# Patient Record
Sex: FEMALE | Race: OTHER | HISPANIC OR LATINO | Employment: FULL TIME | URBAN - METROPOLITAN AREA
[De-identification: names, ages, dates, MRNs, and addresses within clinical notes are randomized per-mention and may not be internally consistent; named-entity substitution may affect disease eponyms.]

---

## 2021-09-02 ENCOUNTER — OFFICE VISIT (OUTPATIENT)
Dept: OBSTETRICS AND GYNECOLOGY | Facility: CLINIC | Age: 51
End: 2021-09-02

## 2021-09-02 VITALS
SYSTOLIC BLOOD PRESSURE: 122 MMHG | BODY MASS INDEX: 34.21 KG/M2 | DIASTOLIC BLOOD PRESSURE: 82 MMHG | HEIGHT: 67 IN | WEIGHT: 218 LBS | HEART RATE: 85 BPM

## 2021-09-02 DIAGNOSIS — N76.0 BACTERIAL VAGINOSIS: ICD-10-CM

## 2021-09-02 DIAGNOSIS — Z12.11 ENCOUNTER FOR SCREENING COLONOSCOPY: ICD-10-CM

## 2021-09-02 DIAGNOSIS — Z76.89 ENCOUNTER TO ESTABLISH CARE WITH NEW DOCTOR: ICD-10-CM

## 2021-09-02 DIAGNOSIS — Z01.419 ENCOUNTER FOR GYNECOLOGICAL EXAMINATION WITHOUT ABNORMAL FINDING: Primary | ICD-10-CM

## 2021-09-02 DIAGNOSIS — B96.89 BACTERIAL VAGINOSIS: ICD-10-CM

## 2021-09-02 DIAGNOSIS — Z12.31 ENCOUNTER FOR MAMMOGRAM TO ESTABLISH BASELINE MAMMOGRAM: ICD-10-CM

## 2021-09-02 PROCEDURE — 99386 PREV VISIT NEW AGE 40-64: CPT | Mod: S$GLB,,, | Performed by: OBSTETRICS & GYNECOLOGY

## 2021-09-02 PROCEDURE — 99386 PR PREVENTIVE VISIT,NEW,40-64: ICD-10-PCS | Mod: S$GLB,,, | Performed by: OBSTETRICS & GYNECOLOGY

## 2021-09-02 RX ORDER — METRONIDAZOLE 500 MG/1
500 TABLET ORAL EVERY 12 HOURS
Qty: 14 TABLET | Refills: 0 | Status: SHIPPED | OUTPATIENT
Start: 2021-09-02 | End: 2021-09-09

## 2021-09-28 ENCOUNTER — OFFICE VISIT (OUTPATIENT)
Dept: SURGERY | Facility: CLINIC | Age: 51
End: 2021-09-28

## 2021-09-28 DIAGNOSIS — Z78.9 AT AVERAGE RISK FOR MALIGNANT NEOPLASM OF COLON: ICD-10-CM

## 2021-09-28 PROCEDURE — 99202 PR OFFICE/OUTPT VISIT, NEW, LEVL II, 15-29 MIN: ICD-10-PCS | Mod: S$GLB,,, | Performed by: SURGERY

## 2021-09-28 PROCEDURE — 99202 OFFICE O/P NEW SF 15 MIN: CPT | Mod: S$GLB,,, | Performed by: SURGERY

## 2021-10-01 LAB
CALCIUM BLD-MCNC: POSITIVE MG/DL
COVID-19 AB, IGM: NEGATIVE

## 2021-10-08 ENCOUNTER — OUTSIDE PLACE OF SERVICE (OUTPATIENT)
Dept: ADMINISTRATIVE | Facility: OTHER | Age: 51
End: 2021-10-08

## 2021-10-08 LAB — CRC RECOMMENDATION EXT: NORMAL

## 2021-10-08 PROCEDURE — G0121 COLON CA SCRN NOT HI RSK IND: HCPCS | Mod: ,,, | Performed by: SURGERY

## 2021-10-08 PROCEDURE — G0121 COLON CA SCRN NOT HI RSK IND: ICD-10-PCS | Mod: ,,, | Performed by: SURGERY

## 2022-01-25 ENCOUNTER — CLINICAL SUPPORT (OUTPATIENT)
Dept: HEMATOLOGY/ONCOLOGY | Facility: CLINIC | Age: 52
End: 2022-01-25

## 2022-01-25 DIAGNOSIS — R09.81 NASAL CONGESTION: Primary | ICD-10-CM

## 2022-01-25 LAB
CTP QC/QA: YES
SARS-COV-2 AG RESP QL IA.RAPID: NEGATIVE

## 2022-01-25 PROCEDURE — 87811 SARS-COV-2 COVID19 W/OPTIC: CPT | Mod: S$GLB,,, | Performed by: INTERNAL MEDICINE

## 2022-01-25 PROCEDURE — 87811 SARS CORONAVIRUS 2 ANTIGEN POCT, MANUAL READ: ICD-10-PCS | Mod: S$GLB,,, | Performed by: INTERNAL MEDICINE

## 2022-12-09 RX ORDER — METRONIDAZOLE 7.5 MG/G
GEL VAGINAL 2 TIMES DAILY
Status: CANCELLED | OUTPATIENT
Start: 2022-12-09

## 2024-01-29 ENCOUNTER — OFFICE VISIT (OUTPATIENT)
Dept: OBSTETRICS AND GYNECOLOGY | Facility: CLINIC | Age: 54
End: 2024-01-29
Payer: COMMERCIAL

## 2024-01-29 VITALS
DIASTOLIC BLOOD PRESSURE: 84 MMHG | HEIGHT: 67 IN | HEART RATE: 86 BPM | WEIGHT: 203.13 LBS | SYSTOLIC BLOOD PRESSURE: 144 MMHG | BODY MASS INDEX: 31.88 KG/M2

## 2024-01-29 DIAGNOSIS — N95.2 ATROPHIC VAGINITIS: ICD-10-CM

## 2024-01-29 DIAGNOSIS — R03.0 ELEVATED BLOOD PRESSURE READING WITHOUT DIAGNOSIS OF HYPERTENSION: ICD-10-CM

## 2024-01-29 DIAGNOSIS — Z01.419 WELL WOMAN EXAM WITH ROUTINE GYNECOLOGICAL EXAM: Primary | ICD-10-CM

## 2024-01-29 DIAGNOSIS — N76.1 SUBACUTE VAGINITIS: ICD-10-CM

## 2024-01-29 DIAGNOSIS — Z12.31 VISIT FOR SCREENING MAMMOGRAM: ICD-10-CM

## 2024-01-29 PROCEDURE — 99396 PREV VISIT EST AGE 40-64: CPT | Mod: S$GLB,,, | Performed by: NURSE PRACTITIONER

## 2024-01-29 RX ORDER — ESTRADIOL 0.1 MG/G
1 CREAM VAGINAL
Qty: 42.5 G | Refills: 3 | Status: SHIPPED | OUTPATIENT
Start: 2024-01-29 | End: 2024-04-29

## 2024-01-29 RX ORDER — FLUCONAZOLE 150 MG/1
150 TABLET ORAL EVERY OTHER DAY
Qty: 1 TABLET | Refills: 1 | Status: SHIPPED | OUTPATIENT
Start: 2024-01-29 | End: 2024-02-01

## 2024-01-31 LAB
CHLAMYDIA: NEGATIVE
GONORRHEA: NEGATIVE
SOURCE: NORMAL
SOURCE: NORMAL
TRICHOMONAS AMPLIFIED: NEGATIVE

## 2024-04-29 ENCOUNTER — OFFICE VISIT (OUTPATIENT)
Dept: OBSTETRICS AND GYNECOLOGY | Facility: CLINIC | Age: 54
End: 2024-04-29
Payer: COMMERCIAL

## 2024-04-29 VITALS
DIASTOLIC BLOOD PRESSURE: 79 MMHG | HEART RATE: 92 BPM | WEIGHT: 199.63 LBS | SYSTOLIC BLOOD PRESSURE: 136 MMHG | HEIGHT: 67 IN | BODY MASS INDEX: 31.33 KG/M2

## 2024-04-29 DIAGNOSIS — N76.0 ACUTE VAGINITIS: Primary | ICD-10-CM

## 2024-04-29 DIAGNOSIS — N95.2 ATROPHIC VAGINITIS: ICD-10-CM

## 2024-04-29 PROCEDURE — 99213 OFFICE O/P EST LOW 20 MIN: CPT | Mod: S$GLB,,, | Performed by: NURSE PRACTITIONER

## 2024-04-29 RX ORDER — METRONIDAZOLE 500 MG/1
500 TABLET ORAL 2 TIMES DAILY
Qty: 14 TABLET | Refills: 0 | Status: SHIPPED | OUTPATIENT
Start: 2024-04-29 | End: 2024-05-06

## 2024-04-29 RX ORDER — FLUCONAZOLE 150 MG/1
150 TABLET ORAL EVERY OTHER DAY
Qty: 1 TABLET | Refills: 0 | Status: SHIPPED | OUTPATIENT
Start: 2024-04-29 | End: 2024-05-02

## 2024-04-29 NOTE — PROGRESS NOTES
"  Subjective:       Patient ID: Val Swan is a 54 y.o. female.    Chief Complaint:  Follow-up      History of Present Illness   Presents for on vagina estrogen.   Reports her itching and dryness have improved, but noticed an increase in itching, irritation, and discharge over the last week.  Using estrogen cream, but would like something less messy.   Sexually active with long term male partner     OB History          2    Para   2    Term   2            AB        Living   2         SAB        IAB        Ectopic        Multiple        Live Births   2                  Review of Systems  Review of Systems   Constitutional:  Negative for chills and fever.   Respiratory:  Negative for shortness of breath.    Cardiovascular:  Negative for chest pain.   Gastrointestinal:  Negative for abdominal pain, blood in stool, constipation, diarrhea, nausea, vomiting and reflux.   Genitourinary:  Positive for vaginal discharge. Negative for dysmenorrhea, dyspareunia, dysuria, hematuria, hot flashes, menorrhagia, menstrual problem, pelvic pain, vaginal bleeding, postcoital bleeding and vaginal dryness.   Musculoskeletal:  Negative for arthralgias and joint swelling.   Integumentary:  Negative for rash and hair changes.   Psychiatric/Behavioral:  Negative for depression. The patient is not nervous/anxious.            Objective:     Vitals:    24 1147   BP: 136/79   Pulse: 92   Weight: 90.5 kg (199 lb 9.6 oz)   Height: 5' 7" (1.702 m)        Physical Exam:   Constitutional: She is oriented to person, place, and time. She appears well-developed and well-nourished.    HENT:   Head: Normocephalic and atraumatic.     Neck: No thyromegaly present.    Cardiovascular:  Normal rate.             Pulmonary/Chest: Effort normal. No respiratory distress.        Abdominal: Soft. There is no abdominal tenderness.     Genitourinary:    Inguinal canal and rectum normal.      Pelvic exam was performed with patient supine.   The " external female genitalia was normal.   Genitalia hair distrobution normal .   There is erythema and vaginal discharge in the vagina. Cervix is absent.Uterus is absent.           Musculoskeletal: Moves all extremeties.       Neurological: She is alert and oriented to person, place, and time.    Skin: Skin is warm and dry.    Psychiatric: She has a normal mood and affect. Her behavior is normal. Judgment and thought content normal.       Slide examined under the microscope and there were per HPF  Clue cells +many  Hyphae-none  Trichomonas -none  WBC-0-5      Assessment:     1. Acute vaginitis    2. Atrophic vaginitis              Plan:       Acute vaginitis  -     metroNIDAZOLE (FLAGYL) 500 MG tablet; Take 1 tablet (500 mg total) by mouth 2 (two) times a day. for 7 days  Dispense: 14 tablet; Refill: 0  -     fluconazole (DIFLUCAN) 150 MG Tab; Take 1 tablet (150 mg total) by mouth every other day. for 2 doses  Dispense: 1 tablet; Refill: 0    Atrophic vaginitis  -     estradioL 4 mcg Inst; Place 4 mcg vaginally twice a week.  Dispense: 8 each; Refill: 11    One swab collected   Patient was counseled today on vaginitis prevention including :  a. avoiding feminine products such as deoderant soaps, body wash, bubble bath, douches, scented toilet paper, deoderant tampons or pads, feminine wipes, chronic pad use, etc.  b. avoiding other vulvovaginal irritants such as long hot baths, humidity, tight, synthetic clothing, chlorine and sitting around in wet bathing suits  c. wearing cotton underwear, avoiding thong underwear and no underwear to bed  d. taking showers instead of baths and use a hair dryer on cool setting afterwards to dry  e. wearing cotton to exercise and shower immediately after exercise and change clothes  f. using polyurethane condoms without spermicide if sexually active and symptoms are triggered by intercourse    FOLLOW UP: PRN lack of improvement.     Follow up in about 6 months (around 10/29/2024).

## 2024-05-06 DIAGNOSIS — B37.31 YEAST VAGINITIS: Primary | ICD-10-CM

## 2024-05-06 LAB — BCS RECOMMENDATION EXT: NORMAL

## 2024-05-28 DIAGNOSIS — B37.31 YEAST VAGINITIS: ICD-10-CM

## 2024-05-28 RX ORDER — FLUCONAZOLE 150 MG/1
150 TABLET ORAL ONCE
Qty: 1 TABLET | Refills: 2 | Status: SHIPPED | OUTPATIENT
Start: 2024-05-28 | End: 2024-05-28 | Stop reason: SDUPTHER

## 2024-05-28 RX ORDER — FLUCONAZOLE 150 MG/1
150 TABLET ORAL ONCE
Qty: 1 TABLET | Refills: 2 | Status: SHIPPED | OUTPATIENT
Start: 2024-05-28 | End: 2024-05-28

## 2024-06-27 ENCOUNTER — DOCUMENTATION ONLY (OUTPATIENT)
Dept: OBSTETRICS AND GYNECOLOGY | Facility: CLINIC | Age: 54
End: 2024-06-27
Payer: COMMERCIAL

## 2024-07-16 ENCOUNTER — OFFICE VISIT (OUTPATIENT)
Dept: PAIN MEDICINE | Facility: CLINIC | Age: 54
End: 2024-07-16
Payer: COMMERCIAL

## 2024-07-16 VITALS
SYSTOLIC BLOOD PRESSURE: 153 MMHG | WEIGHT: 196 LBS | HEIGHT: 67 IN | DIASTOLIC BLOOD PRESSURE: 83 MMHG | BODY MASS INDEX: 30.76 KG/M2 | HEART RATE: 83 BPM

## 2024-07-16 DIAGNOSIS — M77.11 RIGHT LATERAL EPICONDYLITIS: Primary | ICD-10-CM

## 2024-07-16 DIAGNOSIS — M25.521 RIGHT ELBOW PAIN: ICD-10-CM

## 2024-07-16 PROCEDURE — 20551 NJX 1 TENDON ORIGIN/INSJ: CPT | Mod: S$GLB,,, | Performed by: PHYSICAL MEDICINE & REHABILITATION

## 2024-07-16 PROCEDURE — 99204 OFFICE O/P NEW MOD 45 MIN: CPT | Mod: 25,S$GLB,, | Performed by: PHYSICAL MEDICINE & REHABILITATION

## 2024-07-16 RX ORDER — TRIAMCINOLONE ACETONIDE 40 MG/ML
40 INJECTION, SUSPENSION INTRA-ARTICULAR; INTRAMUSCULAR
Status: DISCONTINUED | OUTPATIENT
Start: 2024-07-16 | End: 2024-07-16 | Stop reason: HOSPADM

## 2024-07-16 RX ADMIN — TRIAMCINOLONE ACETONIDE 40 MG: 40 INJECTION, SUSPENSION INTRA-ARTICULAR; INTRAMUSCULAR at 08:07

## 2024-07-16 NOTE — PROCEDURES
Tendon Origin: R elbow    Date/Time: 7/16/2024 8:00 AM    Performed by: Ginger Gifford MD  Authorized by: Ginger Gifford MD    Consent Done?:  Yes (Written)  Timeout: prior to procedure the correct patient, procedure, and site was verified    Indications:  Pain  Site marked: the procedure site was marked    Timeout: prior to procedure the correct patient, procedure, and site was verified    Location:  Elbow  Site:  R elbow (extensor tendon origin)  Prep: patient was prepped and draped in usual sterile fashion    Ultrasonic Guidance for Needle Placement?: No    Needle size:  25 G  Approach:  Posterolateral  Medications:  40 mg triamcinolone acetonide 40 mg/mL  Patient tolerance:  Patient tolerated the procedure well with no immediate complications

## 2024-07-16 NOTE — PROGRESS NOTES
[facility]      Referring Provider: Moody Mclaughlin Md  [address], [address]  [place] [address]    Chief Complaint:   Chief Complaint   Patient presents with    Elbow Pain     right       History of Present Illness: Val Swan is a 54 y.o. female referred by Dr. Moody Mclaughlin for right elbow pain.      Onset: x1 month   Location: right elbow, lateral  Radiation: none.   Timing: intermittent  Quality: Numb  Exacerbating Factors: lifting  Alleviating Factors: medications (OTC Ibuprofen)  Associated Symptoms: She does get some numbness in the right hand at night time, mostly small and ring fingers, but does involve the whole hand. She denies night fever/night sweats, urinary incontinence/change in function, bowel incontinence/change in function, unexplained weight loss, significant motor weakness, history of cancer, and waking from sleep due to pain    Severity: Currently: 6/10   Typical Range: 6-10/10     Exacerbation: 10/10     Functional Limitations: Moderate to severe pain is limiting Sleep, Home, and Work.    Employment Status: P[employer]    P = 6  E = 0  G = 6  Baseline PEG Score = 4  Current PEG Score: 4    Opioid Risk Score         Value Time User    Opioid Risk Score  0 7/16/2024  8:14 AM Julissa Gruber MA             Previous Interventions:  -     Previous Therapies:  PT/OT: no   Chiropractor: No  Massage: no   Acupuncture: no   Relevant Surgery: Yes   - left meniscus repair x 2  Previous Medications:   - NSAIDS: ibuprofen  - Muscle Relaxants:    - TCAs:   - SNRIs:   - Topicals:   - Anticonvulsants:    - Opioids:     Current Pain Medications:  Ibuprofen OTC      Blood Thinners: None    Full Medication List:    Current Outpatient Medications:     estradioL 4 mcg Inst, Place 4 mcg vaginally twice a week. (Patient not taking: Reported on 7/16/2024), Disp: 8 each, Rfl: 11     Review of Systems: See HPI    Allergies:  Shellfish containing products     Medical  "History:   has no past medical history on file.    Surgical History:   has a past surgical history that includes  section; Hysterectomy; and Repair of meniscus of knee (Left).    Family History:  family history includes No Known Problems in her brother, father, maternal grandfather, maternal grandmother, mother, paternal grandfather, and paternal grandmother.    Social History:   reports that she has never smoked. She has never used smokeless tobacco. She reports current alcohol use. She reports that she does not use drugs.    Physical Exam:  BP (!) 153/83   Pulse 83   Ht 5' 7" (1.702 m)   Wt 88.9 kg (196 lb)   BMI 30.70 kg/m²   GEN: No acute distress. Calm, comfortable  HENT: Normocephalic, atraumatic, moist mucous membranes  EYE: Anicteric sclera, non-injected.   CV: Non-diaphoretic. Regular Rate. Radial Pulses 2+.  RESP: Breathing comfortably. Chest expansion symmetric.  EXT: No clubbing, cyanosis.   SKIN: Warm, & dry to palpation. No visible rashes or lesions of exposed skin.   PSYCH: Pleasant mood and appropriate affect. Recent and remote memory intact.   GAIT: Independent, normal ambulation  Elbow Exam:       Inspection: No erythema, bruising, deformity, swelling.       Palpation: (+) TTP of right lateral epicondyle and extensor tendons      ROM: Full active and passive elbow extension and flexion b/l      Provocative Maneuvers:  (-) at medial elbow with resisted wrist flexion bilaterally  (+) Cozen's Test with pain at lateral elbow with resisted wrist extension on the right  Neurologic Exam:     Alert. Speech is fluent and appropriate.     Strength: 4/5 in left AbDM, otherwise 5/5 throughout bilateral upper extremities     Sensation: Grossly intact to light touch in bilateral upper extremities     Reflexes: 1+ in b/l biceps, brachioradialis, triceps     Tone: No abnormality appreciated      (-) Blount bilaterally    Imaging:  - X-ray right elbow 24:  No acute abnormality with minimal lateral " "epicondyle enthesophyte change. No large joint effusion.     Labs:  BMP  No results found for: "NA", "K", "CL", "CO2", "BUN", "CREATININE", "CALCIUM", "ANIONGAP", "EGFRNORACEVR"  No results found for: "ALT", "AST", "GGT", "ALKPHOS", "BILITOT"  No results found for: "PLT"    Assessment:  Val Swan is a 54 y.o. female with the following diagnoses based on history, exam, and imaging:    Problem List Items Addressed This Visit    None  Visit Diagnoses       Right lateral epicondylitis    -  Primary    Relevant Orders    X-Ray Elbow Complete Right (Completed)    Tendon Origin: R elbow    Right elbow pain        Relevant Orders    X-Ray Elbow Complete Right (Completed)    Tendon Origin: R elbow            This is a pleasant 54 y.o. lady presenting with:     - Right elbow pain consistent w/ Right lateral epicondylitis not responding to rest and ibuprofen.   - Comorbidities:     Treatment Plan:   - PT/OT/HEP: Provided HEP for right lateral epicondylitis. Discussed benefits of exercise for pain.    - Consider formal OT if not improving.   - Procedures: Performed right wrist extensor tendon origin CSI today in clinic.   - Medications: No changes recommended at this time.  - Imaging: Reviewed. X-ray right elbow.   - Labs: Reviewed.  Medications are appropriately dosed for current hepatorenal function.    Follow Up: RTC PRN if not improving.     Ginger Gifford MD  Interventional Pain Medicine   "

## 2024-09-01 NOTE — PROGRESS NOTES
"    Subjective:       Patient ID: Val Swan is a 54 y.o. female.    Chief Complaint:  Well Woman      History of Present Illness   Presents for annual gyn exam. History and past labs reviewed with patient.    Complains of vaginal itching and dryness fort the last 1-2 months.   Sexually active with long term male partner.  Hys done for AUB      OB History          2    Para   2    Term   2            AB        Living   2         SAB        IAB        Ectopic        Multiple        Live Births   2                  Review of Systems  Review of Systems   Constitutional:  Negative for chills and fever.   Respiratory:  Negative for shortness of breath.    Cardiovascular:  Negative for chest pain.   Gastrointestinal:  Negative for abdominal pain, blood in stool, constipation, diarrhea, nausea, vomiting and reflux.   Genitourinary:  Positive for vaginal discharge and vaginal dryness. Negative for dysmenorrhea, dyspareunia, dysuria, hematuria, hot flashes, menorrhagia, menstrual problem, pelvic pain, vaginal bleeding and postcoital bleeding.   Musculoskeletal:  Negative for arthralgias and joint swelling.   Integumentary:  Negative for rash, hair changes, breast mass, nipple discharge and breast skin changes.   Psychiatric/Behavioral:  Negative for depression. The patient is not nervous/anxious.    Breast: Negative for asymmetry, lump, mass, nipple discharge and skin changes          Objective:     Vitals:    24 1353   BP: (!) 144/84   Pulse: 86   Weight: 92.1 kg (203 lb 2 oz)   Height: 5' 7" (1.702 m)        Physical Exam:   Constitutional: She appears well-developed and well-nourished. No distress.    HENT:   Head: Normocephalic and atraumatic.    Eyes: Conjunctivae and EOM are normal.    Neck: No tracheal deviation present. No thyromegaly present.    Cardiovascular:  Normal rate, regular rhythm and normal heart sounds.      Exam reveals no clubbing, no cyanosis and no edema.        Pulmonary/Chest: " Effort normal. No respiratory distress.        Abdominal: Soft. She exhibits no distension and no mass. There is no abdominal tenderness. There is no rebound and no guarding. No hernia.     Genitourinary:    Rectum normal.      Pelvic exam was performed with patient supine.   There is no rash, tenderness, lesion or injury on the right labia. There is no rash, tenderness, lesion or injury on the left labia. Right adnexum displays no mass, no tenderness and no fullness. Left adnexum displays no mass, no tenderness and no fullness. There is erythema and vaginal discharge in the vagina. Vaginal atrophy noted. Cervix is absent.Uterus is absent.                Skin: Skin is warm and dry. She is not diaphoretic. No cyanosis. Nails show no clubbing.    Psychiatric: She has a normal mood and affect. Her behavior is normal. Judgment and thought content normal.       breast exam wnl- no nipple dc, skin changes, masses or lymph nodes palpated bilaterally    Slide examined under the microscope and there were per HPF  Clue cells-none    Hyphae-rare  Trichomonas-none   WBC-0-5      Assessment:     1. Well woman exam with routine gynecological exam    2. Visit for screening mammogram    3. Atrophic vaginitis    4. Subacute vaginitis    5. Elevated blood pressure reading without diagnosis of hypertension              Plan:       Well woman exam with routine gynecological exam    Visit for screening mammogram  -     Mammo Digital Screening Bilat; Future; Expected date: 01/29/2024    Atrophic vaginitis  -     estradioL (ESTRACE) 0.01 % (0.1 mg/gram) vaginal cream; Place 1 g vaginally twice a week. 1 gm nightly x 14 then 1 gm 2x/week  Dispense: 42.5 g; Refill: 3    Subacute vaginitis  -     fluconazole (DIFLUCAN) 150 MG Tab; Take 1 tablet (150 mg total) by mouth every other day. for 2 doses  Dispense: 1 tablet; Refill: 1  -     C. trachomatis/N. gonorrhoeae by AMP DNA [facility]; Vagina  -     Trichomonas Vaginalis, NIRMALA; Future; Expected  date: 01/29/2024    Elevated blood pressure reading without diagnosis of hypertension         Colonoscopy UTD  Risk assessment for inherited gyn cancer done   Healthy diet, lifestyle, and exercise   OTC Women's multi vitamin  Patient was counseled today on A.C.S. Pap guidelines and recommendations for yearly pelvic exams, mammograms and monthly self breast exams; to see her PCP for other health maintenance.   Ambulatory b/p monitoring    Follow up in about 3 months (around 4/29/2024).    Attending Attestation (For Attendings USE Only)...

## 2024-10-17 ENCOUNTER — PATIENT MESSAGE (OUTPATIENT)
Dept: RESEARCH | Facility: HOSPITAL | Age: 54
End: 2024-10-17
Payer: COMMERCIAL

## 2024-11-20 DIAGNOSIS — B37.9 YEAST INFECTION: Primary | ICD-10-CM

## 2024-11-20 DIAGNOSIS — Z76.89 ENCOUNTER TO ESTABLISH CARE: ICD-10-CM

## 2024-11-20 RX ORDER — FLUCONAZOLE 150 MG/1
150 TABLET ORAL ONCE
Qty: 1 TABLET | Refills: 1 | Status: SHIPPED | OUTPATIENT
Start: 2024-11-20 | End: 2024-11-20

## 2024-12-10 ENCOUNTER — OFFICE VISIT (OUTPATIENT)
Dept: OBSTETRICS AND GYNECOLOGY | Facility: CLINIC | Age: 54
End: 2024-12-10
Payer: COMMERCIAL

## 2024-12-10 VITALS
HEIGHT: 67 IN | BODY MASS INDEX: 30.16 KG/M2 | DIASTOLIC BLOOD PRESSURE: 87 MMHG | HEART RATE: 92 BPM | SYSTOLIC BLOOD PRESSURE: 129 MMHG | WEIGHT: 192.13 LBS

## 2024-12-10 DIAGNOSIS — N95.2 VAGINAL ATROPHY: ICD-10-CM

## 2024-12-10 DIAGNOSIS — B37.9 YEAST INFECTION: Primary | ICD-10-CM

## 2024-12-10 PROCEDURE — 99213 OFFICE O/P EST LOW 20 MIN: CPT | Mod: S$PBB,,, | Performed by: NURSE PRACTITIONER

## 2024-12-10 RX ORDER — CLOTRIMAZOLE AND BETAMETHASONE DIPROPIONATE 10; .64 MG/G; MG/G
CREAM TOPICAL 2 TIMES DAILY
Qty: 15 G | Refills: 1 | Status: SHIPPED | OUTPATIENT
Start: 2024-12-10

## 2024-12-10 RX ORDER — ESTRADIOL 4 UG/1
1 INSERT VAGINAL NIGHTLY
Qty: 8 EACH | Refills: 11 | Status: SHIPPED | OUTPATIENT
Start: 2024-12-10 | End: 2024-12-10

## 2024-12-10 RX ORDER — ESTRADIOL 4 UG/1
1 INSERT VAGINAL
Qty: 8 EACH | Refills: 11 | Status: SHIPPED | OUTPATIENT
Start: 2024-12-12

## 2024-12-10 RX ORDER — FLUCONAZOLE 150 MG/1
150 TABLET ORAL ONCE
Qty: 1 TABLET | Refills: 3 | Status: SHIPPED | OUTPATIENT
Start: 2024-12-10 | End: 2024-12-10

## 2024-12-10 NOTE — PROGRESS NOTES
"    Subjective:       Patient ID: Val Swan is a 54 y.o. female.    Chief Complaint:  Follow-up      History of Present Illness   Presents for recurrent yeast infection and vaginal itching.  Not using vaginal estrogen r/t cost.  Failed with cream.  Pending hgb A1c      OB History          2    Para   2    Term   2            AB        Living   2         SAB        IAB        Ectopic        Multiple        Live Births   2                  Review of Systems  Review of Systems   Constitutional:  Negative for chills and fever.   Respiratory:  Negative for shortness of breath.    Cardiovascular:  Negative for chest pain.   Gastrointestinal:  Negative for abdominal pain, blood in stool, constipation, diarrhea, nausea, vomiting and reflux.   Genitourinary:  Negative for dysmenorrhea, dyspareunia, dysuria, hematuria, hot flashes, menorrhagia, menstrual problem, pelvic pain, vaginal bleeding, vaginal discharge, postcoital bleeding and vaginal dryness.   Musculoskeletal:  Negative for arthralgias and joint swelling.   Integumentary:  Negative for rash and hair changes.   Psychiatric/Behavioral:  Negative for depression. The patient is not nervous/anxious.            Objective:     Vitals:    12/10/24 0923   BP: 129/87   Pulse: 92   Weight: 87.1 kg (192 lb 2 oz)   Height: 5' 7" (1.702 m)        Physical Exam:   Constitutional: She is oriented to person, place, and time. She appears well-developed and well-nourished.    HENT:   Head: Normocephalic and atraumatic.      Cardiovascular:  Normal rate.             Pulmonary/Chest: Effort normal. No respiratory distress.        Abdominal: Soft. There is no abdominal tenderness.             Musculoskeletal: Moves all extremeties.       Neurological: She is alert and oriented to person, place, and time.    Skin: Skin is warm and dry.    Psychiatric: She has a normal mood and affect. Her behavior is normal. Judgment and thought content normal.          Assessment:     1. " Yeast infection    2. Vaginal atrophy              Plan:       Yeast infection  -     fluconazole (DIFLUCAN) 150 MG Tab; Take 1 tablet (150 mg total) by mouth once. for 1 dose  Dispense: 1 tablet; Refill: 3  -     clotrimazole-betamethasone 1-0.05% (LOTRISONE) cream; Apply topically 2 (two) times daily.  Dispense: 15 g; Refill: 1    Vaginal atrophy  -     IMVEXXY MAINTENANCE PACK 4 mcg Inst; Place 1 suppository vaginally every evening.  Dispense: 8 each; Refill: 11       Needs hgb A1C  Appt with PCP in January  Coupon for imvexxy   MMG UTD    Follow up in about 1 year (around 12/10/2025).

## 2024-12-19 ENCOUNTER — OFFICE VISIT (OUTPATIENT)
Dept: PRIMARY CARE CLINIC | Facility: CLINIC | Age: 54
End: 2024-12-19
Payer: COMMERCIAL

## 2024-12-19 VITALS
BODY MASS INDEX: 29.82 KG/M2 | OXYGEN SATURATION: 99 % | HEIGHT: 67 IN | DIASTOLIC BLOOD PRESSURE: 80 MMHG | WEIGHT: 190 LBS | RESPIRATION RATE: 16 BRPM | SYSTOLIC BLOOD PRESSURE: 122 MMHG | TEMPERATURE: 97 F | HEART RATE: 100 BPM

## 2024-12-19 DIAGNOSIS — E11.69 TYPE 2 DIABETES MELLITUS WITH OTHER SPECIFIED COMPLICATION, WITHOUT LONG-TERM CURRENT USE OF INSULIN: ICD-10-CM

## 2024-12-19 DIAGNOSIS — Z00.00 PREVENTATIVE HEALTH CARE: Primary | ICD-10-CM

## 2024-12-19 PROBLEM — E11.9 TYPE 2 DIABETES MELLITUS, WITHOUT LONG-TERM CURRENT USE OF INSULIN: Status: ACTIVE | Noted: 2024-12-19

## 2024-12-19 LAB
GLUCOSE SERPL-MCNC: 427 MG/DL (ref 70–110)
HBA1C MFR BLD: 11.7 %

## 2024-12-19 PROCEDURE — 99386 PREV VISIT NEW AGE 40-64: CPT | Mod: S$PBB,,, | Performed by: FAMILY MEDICINE

## 2024-12-19 NOTE — PROGRESS NOTES
Subjective     Patient ID: Val Swan is a 54 y.o. female.    Chief Complaint: Establish Care (Her glucose is 427 but she did say that she had a glazed donut this morning /Her A1C is 11.5/She has no family history of diabetes in her family )    HPI    Here to [facility]  Due for annual labs  Poct A1c 11's  She has no symptoms  Utd mmg and c-scope    Review of Systems   Constitutional:  Negative for chills, diaphoresis, fatigue, fever and unexpected weight change.   HENT:  Negative for nasal congestion, hearing loss, rhinorrhea, sinus pressure/congestion, sore throat, trouble swallowing and voice change.    Eyes:  Negative for pain and visual disturbance.   Respiratory:  Negative for cough, chest tightness, shortness of breath and wheezing.    Cardiovascular:  Negative for chest pain, palpitations and leg swelling.   Gastrointestinal:  Negative for abdominal pain, constipation, diarrhea, nausea and vomiting.   Genitourinary:  Negative for decreased urine volume, dysuria, flank pain, frequency, hematuria, pelvic pain, vaginal bleeding and vaginal discharge.   Musculoskeletal:  Negative for arthralgias, back pain, myalgias and neck pain.   Integumentary:  Negative for color change, pallor and rash.   Neurological:  Negative for dizziness, syncope, weakness, light-headedness and headaches.   Hematological:  Negative for adenopathy.   Psychiatric/Behavioral:  Negative for decreased concentration, dysphoric mood and sleep disturbance. The patient is not nervous/anxious.           Objective     Physical Exam  Vitals reviewed.   Constitutional:       General: She is not in acute distress.     Appearance: She is well-developed. She is not diaphoretic.   HENT:      Head: Normocephalic and atraumatic.      Nose: Nose normal.      Mouth/Throat:      Mouth: Mucous membranes are moist.      Pharynx: Oropharynx is clear.   Eyes:      Conjunctiva/sclera: Conjunctivae normal.      Pupils: Pupils are equal, round, and reactive to  light.   Neck:      Thyroid: No thyromegaly.      Vascular: No JVD.   Cardiovascular:      Rate and Rhythm: Normal rate and regular rhythm.      Pulses: Normal pulses.      Heart sounds: Normal heart sounds. No murmur heard.     No friction rub. No gallop.   Pulmonary:      Effort: Pulmonary effort is normal. No respiratory distress.      Breath sounds: Normal breath sounds. No stridor. No wheezing or rales.   Abdominal:      General: Bowel sounds are normal. There is no distension.      Palpations: Abdomen is soft.      Tenderness: There is no abdominal tenderness.   Musculoskeletal:         General: No swelling or tenderness.      Cervical back: Normal range of motion and neck supple.      Right lower leg: No edema.      Left lower leg: No edema.   Lymphadenopathy:      Cervical: No cervical adenopathy.   Skin:     General: Skin is warm and dry.      Coloration: Skin is not pale.      Findings: No erythema or rash.   Neurological:      Mental Status: She is alert and oriented to person, place, and time.   Psychiatric:         Mood and Affect: Mood normal.         Behavior: Behavior normal.            Assessment and Plan     1. Preventative health care  -     Ambulatory referral/consult to Family Practice  -     CBC Auto Differential; Future; Expected date: 12/19/2024  -     Comprehensive Metabolic Panel; Future; Expected date: 12/19/2024  -     TSH; Future; Expected date: 12/19/2024  -     Lipid Panel; Future; Expected date: 12/19/2024  -     T4, Free; Future; Expected date: 12/19/2024    2. Type 2 diabetes mellitus with other specified complication, without long-term current use of insulin  -     POCT Glucose, Hand-Held Device  -     Hemoglobin A1C, POCT                 No follow-ups on file.

## 2024-12-20 ENCOUNTER — TELEPHONE (OUTPATIENT)
Dept: PRIMARY CARE CLINIC | Facility: CLINIC | Age: 54
End: 2024-12-20
Payer: COMMERCIAL

## 2024-12-23 DIAGNOSIS — E11.69 TYPE 2 DIABETES MELLITUS WITH OTHER SPECIFIED COMPLICATION, WITHOUT LONG-TERM CURRENT USE OF INSULIN: Primary | ICD-10-CM

## 2024-12-23 RX ORDER — DAPAGLIFLOZIN 5 MG/1
5 TABLET, FILM COATED ORAL DAILY
Qty: 90 TABLET | Refills: 3 | Status: SHIPPED | OUTPATIENT
Start: 2024-12-23 | End: 2025-01-02 | Stop reason: SDUPTHER

## 2024-12-23 RX ORDER — SEMAGLUTIDE 0.68 MG/ML
0.5 INJECTION, SOLUTION SUBCUTANEOUS
Qty: 3 ML | Refills: 11 | Status: SHIPPED | OUTPATIENT
Start: 2024-12-23 | End: 2025-01-02 | Stop reason: SDUPTHER

## 2024-12-23 RX ORDER — METFORMIN HYDROCHLORIDE 500 MG/1
500 TABLET, EXTENDED RELEASE ORAL
Qty: 90 TABLET | Refills: 3 | Status: SHIPPED | OUTPATIENT
Start: 2024-12-23 | End: 2025-12-23

## 2024-12-26 ENCOUNTER — TELEPHONE (OUTPATIENT)
Dept: PRIMARY CARE CLINIC | Facility: CLINIC | Age: 54
End: 2024-12-26
Payer: COMMERCIAL

## 2024-12-27 ENCOUNTER — TELEPHONE (OUTPATIENT)
Dept: PRIMARY CARE CLINIC | Facility: CLINIC | Age: 54
End: 2024-12-27
Payer: COMMERCIAL

## 2025-01-02 DIAGNOSIS — E11.69 TYPE 2 DIABETES MELLITUS WITH OTHER SPECIFIED COMPLICATION, WITHOUT LONG-TERM CURRENT USE OF INSULIN: Primary | ICD-10-CM

## 2025-01-02 RX ORDER — DAPAGLIFLOZIN 5 MG/1
5 TABLET, FILM COATED ORAL DAILY
Qty: 90 TABLET | Refills: 3 | Status: SHIPPED | OUTPATIENT
Start: 2025-01-02

## 2025-01-02 RX ORDER — SEMAGLUTIDE 0.68 MG/ML
0.5 INJECTION, SOLUTION SUBCUTANEOUS
Qty: 3 ML | Refills: 11 | Status: SHIPPED | OUTPATIENT
Start: 2025-01-02

## 2025-02-05 ENCOUNTER — OFFICE VISIT (OUTPATIENT)
Dept: PAIN MEDICINE | Facility: CLINIC | Age: 55
End: 2025-02-05
Payer: COMMERCIAL

## 2025-02-05 VITALS
HEIGHT: 67 IN | DIASTOLIC BLOOD PRESSURE: 71 MMHG | SYSTOLIC BLOOD PRESSURE: 134 MMHG | HEART RATE: 82 BPM | WEIGHT: 190.06 LBS | BODY MASS INDEX: 29.83 KG/M2

## 2025-02-05 DIAGNOSIS — M77.11 RIGHT LATERAL EPICONDYLITIS: Primary | ICD-10-CM

## 2025-02-05 DIAGNOSIS — M25.521 RIGHT ELBOW PAIN: ICD-10-CM

## 2025-02-05 PROCEDURE — 20551 NJX 1 TENDON ORIGIN/INSJ: CPT | Mod: S$PBB,,, | Performed by: PHYSICAL MEDICINE & REHABILITATION

## 2025-02-05 PROCEDURE — 99499 UNLISTED E&M SERVICE: CPT | Mod: S$PBB,,, | Performed by: PHYSICAL MEDICINE & REHABILITATION

## 2025-02-05 RX ORDER — METHYLPREDNISOLONE ACETATE 40 MG/ML
40 INJECTION, SUSPENSION INTRA-ARTICULAR; INTRALESIONAL; INTRAMUSCULAR; SOFT TISSUE
Status: DISCONTINUED | OUTPATIENT
Start: 2025-02-05 | End: 2025-02-05 | Stop reason: HOSPADM

## 2025-02-05 RX ADMIN — METHYLPREDNISOLONE ACETATE 40 MG: 40 INJECTION, SUSPENSION INTRA-ARTICULAR; INTRALESIONAL; INTRAMUSCULAR; SOFT TISSUE at 02:02

## 2025-02-05 NOTE — PROGRESS NOTES
[facility]      Chief Complaint:   Chief Complaint   Patient presents with    Elbow Pain     right       History of Present Illness: Val Swan is a 55 y.o. female referred by Dr. Moody Mclaughlin for right elbow pain.      Onset: x1 month   Location: right elbow, lateral  Radiation: none.   Timing: intermittent  Quality: Numb  Exacerbating Factors: lifting  Alleviating Factors: medications (OTC Ibuprofen)  Associated Symptoms: She does get some numbness in the right hand at night time, mostly small and ring fingers, but does involve the whole hand. She denies night fever/night sweats, urinary incontinence/change in function, bowel incontinence/change in function, unexplained weight loss, significant motor weakness, history of cancer, and waking from sleep due to pain    Severity: Currently: 6/10   Typical Range: 6-10/10     Exacerbation: 10/10     Functional Limitations: Moderate to severe pain is limiting Sleep, Home, and Work.    Employment Status: "[employer]    P = 6  E = 0  G = 6  Baseline PEG Score = 4    Interval History (02/05/2025):  Val Swan returns today for follow up.  At the last clinic visit, Provided HEP for right lateral epicondylitis. Performed right wrist extensor tendon origin CSI in clinic.     Currently, the right elbow pain is worse.  Last injection provided > 3 months of relief. The elbow pain is unchanged in character or location. Denies any changes in bowel or bladder function. Denies any new weakness or new numbness since the most recent visit. Denies any fevers or recent infections/antibiotics. Denies any unexplained weight loss.     Current Pain Scales:  Current: 8/10              Average: 8/10  Least-Worst: 7-10/10     Current PEG No data recorded    Opioid Risk Score         Value Time User    Opioid Risk Score  0 7/16/2024  8:14 AM Julissa Gruber MA             Previous Interventions:  -     Previous Therapies:  PT/OT: no   Chiropractor: No  Massage:  "no   Acupuncture: no   Relevant Surgery: Yes   - left meniscus repair x 2  Previous Medications:   - NSAIDS: ibuprofen  - Muscle Relaxants:    - TCAs:   - SNRIs:   - Topicals:   - Anticonvulsants:    - Opioids:     Current Pain Medications:  Ibuprofen OTC      Blood Thinners: None    Full Medication List:    Current Outpatient Medications:     clotrimazole-betamethasone 1-0.05% (LOTRISONE) cream, Apply topically 2 (two) times daily., Disp: 15 g, Rfl: 1    dapagliflozin propanediol (FARXIGA) 5 mg Tab tablet, Take 1 tablet (5 mg total) by mouth once daily., Disp: 90 tablet, Rfl: 3    IMVEXXY MAINTENANCE PACK 4 mcg Inst, Place 1 suppository vaginally twice a week., Disp: 8 each, Rfl: 11    metFORMIN (GLUCOPHAGE-XR) 500 MG ER 24hr tablet, Take 1 tablet (500 mg total) by mouth daily with breakfast., Disp: 90 tablet, Rfl: 3    semaglutide (OZEMPIC) 0.25 mg or 0.5 mg (2 mg/3 mL) pen injector, Inject 0.5 mg into the skin every 7 days., Disp: 3 mL, Rfl: 11     Review of Systems: See HPI    Allergies:  Shellfish containing products     Medical History:   has no past medical history on file.    Surgical History:   has a past surgical history that includes  section; Hysterectomy; and Repair of meniscus of knee (Left).    Family History:  family history includes No Known Problems in her brother, father, maternal grandfather, maternal grandmother, mother, paternal grandfather, and paternal grandmother.    Social History:   reports that she has never smoked. She has never used smokeless tobacco. She reports current alcohol use. She reports that she does not use drugs.    Physical Exam:  /71   Pulse 82   Ht 5' 7" (1.702 m)   Wt 86.2 kg (190 lb 0.6 oz)   BMI 29.76 kg/m²   GEN: No acute distress. Calm, comfortable  HENT: Normocephalic, atraumatic, moist mucous membranes  EYE: Anicteric sclera, non-injected.   CV: Non-diaphoretic. Regular Rate. Radial Pulses 2+.  RESP: Breathing comfortably. Chest expansion " symmetric.  EXT: No clubbing, cyanosis.   SKIN: Warm, & dry to palpation. No visible rashes or lesions of exposed skin.   PSYCH: Pleasant mood and appropriate affect. Recent and remote memory intact.   GAIT: Independent, normal ambulation  Elbow Exam:       Inspection: No erythema, bruising, deformity, swelling.       Palpation: (+) TTP of right lateral epicondyle and extensor tendons      ROM: Full active and passive elbow extension and flexion b/l      Provocative Maneuvers:  (-) at medial elbow with resisted wrist flexion bilaterally  (+) Cozen's Test with pain at lateral elbow with resisted wrist extension on the right  Neurologic Exam:     Alert. Speech is fluent and appropriate.     Strength: 4/5 in left AbDM, otherwise 5/5 throughout bilateral upper extremities     Sensation: Grossly intact to light touch in bilateral upper extremities     Reflexes: 1+ in b/l biceps, brachioradialis, triceps     Tone: No abnormality appreciated      (-) Blount bilaterally    Imaging:  - X-ray right elbow 7/16/24:  No acute abnormality with minimal lateral epicondyle enthesophyte change. No large joint effusion.     Labs:  BMP  Lab Results   Component Value Date     12/23/2024    K 3.8 12/23/2024     12/23/2024    CO2 26 12/23/2024    BUN 13.8 12/23/2024    CREATININE 0.82 12/23/2024    CALCIUM 9.1 12/23/2024    ANIONGAP 12.0 12/23/2024     Lab Results   Component Value Date    AST 27 12/23/2024     Lab Results   Component Value Date     12/23/2024       Assessment:  Val Swan is a 55 y.o. female with the following diagnoses based on history, exam, and imaging:    Problem List Items Addressed This Visit    None        This is a pleasant 55 y.o. lady presenting with:     - Right elbow pain consistent w/ Right lateral epicondylitis not responding to rest and ibuprofen.   - Comorbidities:     Treatment Plan:   - PT/OT/HEP: Provided HEP for right lateral epicondylitis. Discussed benefits of exercise for pain.     - Consider formal OT if not improving.   - Procedures: Performed right wrist extensor tendon origin CSI today in clinic.   - Medications: No changes recommended at this time.  - Imaging: Reviewed. X-ray right elbow.   - Labs: Reviewed.  Medications are appropriately dosed for current hepatorenal function.    Follow Up: RTC PRN if not improving.     Ginger Gifford MD  Interventional Pain Medicine

## 2025-02-06 RX ORDER — INSULIN GLARGINE 100 [IU]/ML
15 INJECTION, SOLUTION SUBCUTANEOUS NIGHTLY
Qty: 10 ML | Refills: 5 | Status: SHIPPED | OUTPATIENT
Start: 2025-02-06 | End: 2026-02-06

## 2025-04-07 NOTE — PROCEDURES
Tendon Origin: R elbow    Date/Time: 2/5/2025 2:00 PM    Performed by: Ginger Gifford MD  Authorized by: Ginger Gifford MD    Consent Done?:  Yes (Written)  Timeout: prior to procedure the correct patient, procedure, and site was verified    Indications:  Pain  Site marked: the procedure site was marked    Timeout: prior to procedure the correct patient, procedure, and site was verified    Location:  Elbow  Site:  R elbow  Prep: patient was prepped and draped in usual sterile fashion    Needle size:  25 G  Approach:  Posterolateral  Medications:  40 mg methylPREDNISolone acetate 40 mg/mL  Patient tolerance:  Patient tolerated the procedure well with no immediate complications     no

## 2025-05-05 DIAGNOSIS — B37.9 YEAST INFECTION: Primary | ICD-10-CM

## 2025-05-05 RX ORDER — FLUCONAZOLE 150 MG/1
150 TABLET ORAL ONCE
Qty: 2 TABLET | Refills: 1 | Status: SHIPPED | OUTPATIENT
Start: 2025-05-05 | End: 2025-05-05

## 2025-05-06 ENCOUNTER — OFFICE VISIT (OUTPATIENT)
Dept: PRIMARY CARE CLINIC | Facility: CLINIC | Age: 55
End: 2025-05-06
Payer: COMMERCIAL

## 2025-05-06 VITALS
WEIGHT: 194 LBS | HEART RATE: 89 BPM | SYSTOLIC BLOOD PRESSURE: 124 MMHG | HEIGHT: 67 IN | OXYGEN SATURATION: 97 % | BODY MASS INDEX: 30.45 KG/M2 | RESPIRATION RATE: 18 BRPM | DIASTOLIC BLOOD PRESSURE: 80 MMHG

## 2025-05-06 DIAGNOSIS — E11.69 TYPE 2 DIABETES MELLITUS WITH OTHER SPECIFIED COMPLICATION, WITHOUT LONG-TERM CURRENT USE OF INSULIN: ICD-10-CM

## 2025-05-06 DIAGNOSIS — Z12.39 ENCOUNTER FOR SCREENING FOR MALIGNANT NEOPLASM OF BREAST, UNSPECIFIED SCREENING MODALITY: Primary | ICD-10-CM

## 2025-05-06 DIAGNOSIS — E66.9 OBESITY (BMI 30-39.9): ICD-10-CM

## 2025-05-06 LAB — HBA1C MFR BLD: 11.5 % (ref 4–6)

## 2025-05-06 PROCEDURE — 99204 OFFICE O/P NEW MOD 45 MIN: CPT | Mod: S$PBB,,, | Performed by: INTERNAL MEDICINE

## 2025-05-06 NOTE — PROGRESS NOTES
Subjective:      Patient ID: Val Swan is a 55 y.o. female.    Chief Complaint: Establish Care    HPI    History reviewed. No pertinent past medical history.      Patient with uncontrolled DM2 previously established with Dr Newman  She was prescribed many medications and tells me she can't afford to take them  She states metformin tears up her stomach      Review of Systems   Constitutional:  Negative for chills and fever.   HENT:  Negative for hearing loss.    Eyes:  Negative for blurred vision.   Respiratory:  Negative for cough, shortness of breath and wheezing.    Cardiovascular:  Negative for chest pain, palpitations and leg swelling.   Gastrointestinal:  Negative for abdominal pain, blood in stool, constipation, diarrhea, melena, nausea and vomiting.   Genitourinary:  Negative for dysuria, frequency and urgency.   Musculoskeletal:  Negative for falls.   Skin:  Negative for rash.   Neurological:  Negative for dizziness and headaches.   Endo/Heme/Allergies:  Does not bruise/bleed easily.   Psychiatric/Behavioral:  Negative for depression. The patient is not nervous/anxious.      Objective:     Physical Exam  Vitals reviewed.   Constitutional:       Appearance: Normal appearance.   HENT:      Head: Normocephalic.      Mouth/Throat:      Mouth: Mucous membranes are moist.      Pharynx: Oropharynx is clear.   Eyes:      Extraocular Movements: Extraocular movements intact.      Conjunctiva/sclera: Conjunctivae normal.      Pupils: Pupils are equal, round, and reactive to light.   Cardiovascular:      Rate and Rhythm: Normal rate and regular rhythm.   Pulmonary:      Effort: Pulmonary effort is normal.      Breath sounds: Normal breath sounds.   Abdominal:      General: Bowel sounds are normal.   Musculoskeletal:      Right lower leg: No edema.      Left lower leg: No edema.   Skin:     General: Skin is warm.      Capillary Refill: Capillary refill takes less than 2 seconds.   Neurological:      Mental Status: She  "is alert and oriented to person, place, and time.   Psychiatric:         Mood and Affect: Mood normal.       /80 (BP Location: Right arm, Patient Position: Sitting)   Pulse 89   Resp 18   Ht 5' 7" (1.702 m)   Wt 88 kg (194 lb)   SpO2 97%   BMI 30.38 kg/m²     Assessment:       ICD-10-CM ICD-9-CM   1. Encounter for screening for malignant neoplasm of breast, unspecified screening modality  Z12.39 V76.10   2. Type 2 diabetes mellitus with other specified complication, without long-term current use of insulin  E11.69 250.80   3. Obesity (BMI 30-39.9)  E66.9 278.00       Plan:     Medication List with Changes/Refills   Current Medications    CLOTRIMAZOLE-BETAMETHASONE 1-0.05% (LOTRISONE) CREAM    Apply topically 2 (two) times daily.    DAPAGLIFLOZIN PROPANEDIOL (FARXIGA) 5 MG TAB TABLET    Take 1 tablet (5 mg total) by mouth once daily.    IMVEXXY MAINTENANCE PACK 4 MCG INST    Place 1 suppository vaginally twice a week.    INSULIN GLARGINE U-100, LANTUS, (LANTUS SOLOSTAR U-100 INSULIN) 100 UNIT/ML (3 ML) INPN PEN    Inject 15 Units into the skin every evening.    METFORMIN (GLUCOPHAGE-XR) 500 MG ER 24HR TABLET    Take 1 tablet (500 mg total) by mouth daily with breakfast.   Changed and/or Refilled Medications    Modified Medication Previous Medication    SEMAGLUTIDE (OZEMPIC) 0.25 MG OR 0.5 MG (2 MG/3 ML) PEN INJECTOR semaglutide (OZEMPIC) 0.25 mg or 0.5 mg (2 mg/3 mL) pen injector       Inject 0.5 mg into the skin every 7 days.    Inject 0.5 mg into the skin every 7 days.        1. Encounter for screening for malignant neoplasm of breast, unspecified screening modality  -     Mammo Digital Screening Bilat; Future; Expected date: 05/06/2025    2. Type 2 diabetes mellitus with other specified complication, without long-term current use of insulin  -     semaglutide (OZEMPIC) 0.25 mg or 0.5 mg (2 mg/3 mL) pen injector; Inject 0.5 mg into the skin every 7 days.  Dispense: 3 mL; Refill: 1    3. Obesity (BMI " 30-39.9)       Counseled on diet and exercise    She was given samples for 2 boxes of Ozempic  I discussed with her pharmacy and also with the PA nurse regarding the price on the Lantus it is about 60 dollars for a 2 month supply I informed the patient this and if she wants she can at least  a 1 month supply so she can start taking it  She needs to verify with her insurance what medications are covered under her plan      Future Appointments   Date Time Provider Department Center   6/11/2025  9:30 AM Kimberly Doyle MD [id number]

## 2025-05-12 RX ORDER — SEMAGLUTIDE 0.68 MG/ML
0.5 INJECTION, SOLUTION SUBCUTANEOUS
Qty: 3 ML | Refills: 1 | COMMUNITY
Start: 2025-05-12

## 2025-05-30 RX ORDER — AMOXICILLIN 500 MG/1
CAPSULE ORAL
COMMUNITY
Start: 2025-04-24

## 2025-05-30 RX ORDER — FLUCONAZOLE 150 MG/1
TABLET ORAL
COMMUNITY
Start: 2025-05-05

## 2025-06-11 ENCOUNTER — OFFICE VISIT (OUTPATIENT)
Dept: PRIMARY CARE CLINIC | Facility: CLINIC | Age: 55
End: 2025-06-11
Payer: COMMERCIAL

## 2025-06-11 VITALS
SYSTOLIC BLOOD PRESSURE: 128 MMHG | OXYGEN SATURATION: 99 % | WEIGHT: 187.63 LBS | BODY MASS INDEX: 29.45 KG/M2 | HEIGHT: 67 IN | HEART RATE: 83 BPM | DIASTOLIC BLOOD PRESSURE: 78 MMHG

## 2025-06-11 DIAGNOSIS — E11.69 TYPE 2 DIABETES MELLITUS WITH OTHER SPECIFIED COMPLICATION, WITHOUT LONG-TERM CURRENT USE OF INSULIN: Primary | ICD-10-CM

## 2025-06-11 DIAGNOSIS — E78.5 HYPERLIPIDEMIA, UNSPECIFIED HYPERLIPIDEMIA TYPE: ICD-10-CM

## 2025-06-11 LAB — GLUCOSE SERPL-MCNC: 115 MG/DL (ref 70–110)

## 2025-06-11 PROCEDURE — 99213 OFFICE O/P EST LOW 20 MIN: CPT | Mod: S$PBB,,, | Performed by: INTERNAL MEDICINE

## 2025-06-11 RX ORDER — ATORVASTATIN CALCIUM 40 MG/1
40 TABLET, FILM COATED ORAL DAILY
Qty: 90 TABLET | Refills: 3 | Status: SHIPPED | OUTPATIENT
Start: 2025-06-11 | End: 2026-06-11

## 2025-06-11 NOTE — PROGRESS NOTES
"Subjective:      Patient ID: Val Swan is a 55 y.o. female.    Chief Complaint: Follow-up (; coffee only this am )    HPI    No past medical history on file.      Patient recently seen for uncontrolled DM2 and insulin started which she was able to   She cannot tolerate metformin  She was given sample for ozempic and sent to pharmacy but it is too expensive       Review of Systems   Constitutional:  Negative for chills and fever.   HENT:  Negative for hearing loss.    Eyes:  Negative for blurred vision.   Respiratory:  Negative for cough, shortness of breath and wheezing.    Cardiovascular:  Negative for chest pain, palpitations and leg swelling.   Gastrointestinal:  Negative for abdominal pain, blood in stool, constipation, diarrhea, melena, nausea and vomiting.   Genitourinary:  Negative for dysuria, frequency and urgency.   Musculoskeletal:  Negative for falls.   Skin:  Negative for rash.   Neurological:  Negative for dizziness and headaches.   Endo/Heme/Allergies:  Does not bruise/bleed easily.   Psychiatric/Behavioral:  Negative for depression. The patient is not nervous/anxious.      Objective:     Physical Exam  Vitals reviewed.   Constitutional:       Appearance: Normal appearance.   HENT:      Head: Normocephalic.      Mouth/Throat:      Mouth: Mucous membranes are moist.      Pharynx: Oropharynx is clear.   Eyes:      Extraocular Movements: Extraocular movements intact.      Conjunctiva/sclera: Conjunctivae normal.      Pupils: Pupils are equal, round, and reactive to light.   Musculoskeletal:      Right lower leg: No edema.      Left lower leg: No edema.   Skin:     General: Skin is warm.      Capillary Refill: Capillary refill takes less than 2 seconds.   Neurological:      General: No focal deficit present.      Mental Status: She is alert.   Psychiatric:         Mood and Affect: Mood normal.       /78 (BP Location: Right arm, Patient Position: Sitting)   Pulse 83   Ht 5' 7" (1.702 " m)   Wt 85.1 kg (187 lb 9.6 oz)   SpO2 99%   BMI 29.38 kg/m²     Assessment:       ICD-10-CM ICD-9-CM   1. Type 2 diabetes mellitus with other specified complication, without long-term current use of insulin  E11.69 250.80   2. Hyperlipidemia, unspecified hyperlipidemia type  E78.5 272.4       Plan:     Medication List with Changes/Refills   New Medications    ATORVASTATIN (LIPITOR) 40 MG TABLET    Take 1 tablet (40 mg total) by mouth once daily.    BLOOD SUGAR DIAGNOSTIC STRP    1 strip by Misc.(Non-Drug; Combo Route) route 2 (two) times a day.    TIRZEPATIDE (MOUNJARO) 2.5 MG/0.5 ML PNIJ    Inject 2.5 mg into the skin every 7 days.   Current Medications    AMOXICILLIN (AMOXIL) 500 MG CAPSULE    TAKE 1 CAPSULE BY MOUTH EVERY 8 HOURS UNTIL GONE    CLOTRIMAZOLE-BETAMETHASONE 1-0.05% (LOTRISONE) CREAM    Apply topically 2 (two) times daily.    FLUCONAZOLE (DIFLUCAN) 150 MG TAB        IMVEXXY MAINTENANCE PACK 4 MCG INST    Place 1 suppository vaginally twice a week.    INSULIN GLARGINE U-100, LANTUS, (LANTUS SOLOSTAR U-100 INSULIN) 100 UNIT/ML (3 ML) INPN PEN    Inject 15 Units into the skin every evening.    SEMAGLUTIDE (OZEMPIC) 0.25 MG OR 0.5 MG (2 MG/3 ML) PEN INJECTOR    Inject 0.5 mg into the skin every 7 days.   Discontinued Medications    DAPAGLIFLOZIN PROPANEDIOL (FARXIGA) 5 MG TAB TABLET    Take 1 tablet (5 mg total) by mouth once daily.    METFORMIN (GLUCOPHAGE-XR) 500 MG ER 24HR TABLET    Take 1 tablet (500 mg total) by mouth daily with breakfast.        1. Type 2 diabetes mellitus with other specified complication, without long-term current use of insulin  -     blood sugar diagnostic Strp; 1 strip by Misc.(Non-Drug; Combo Route) route 2 (two) times a day.  Dispense: 200 each; Refill: 2  -     Ambulatory referral/consult to Ophthalmology; Future; Expected date: 06/18/2025  -     tirzepatide (MOUNJARO) 2.5 mg/0.5 mL PnIj; Inject 2.5 mg into the skin every 7 days.  Dispense: 8 Pen; Refill: 0    2.  Hyperlipidemia, unspecified hyperlipidemia type  -     atorvastatin (LIPITOR) 40 MG tablet; Take 1 tablet (40 mg total) by mouth once daily.  Dispense: 90 tablet; Refill: 3         Patient states ozmepic is costing her too much. I am giving her Mounjaro samples     Advised to check with her insurance which medications are covered. I might be left with just glimepiride. She needs to see if Celina/CGM is also covered as she is on insulin.     Mammogram ordered at the last visit       Future Appointments   Date Time Provider Department Center   9/11/2025  8:15 AM Kimberly Doyle MD [id number]

## 2025-06-17 PROBLEM — E11.9 TYPE 2 DIABETES MELLITUS, WITHOUT LONG-TERM CURRENT USE OF INSULIN: Status: RESOLVED | Noted: 2024-12-19 | Resolved: 2025-06-17

## 2025-06-17 RX ORDER — TIRZEPATIDE 2.5 MG/.5ML
2.5 INJECTION, SOLUTION SUBCUTANEOUS
Qty: 8 PEN | Refills: 0 | COMMUNITY
Start: 2025-06-17

## 2025-06-24 ENCOUNTER — TELEPHONE (OUTPATIENT)
Dept: PRIMARY CARE CLINIC | Facility: CLINIC | Age: 55
End: 2025-06-24
Payer: COMMERCIAL

## 2025-06-24 ENCOUNTER — PATIENT MESSAGE (OUTPATIENT)
Dept: PRIMARY CARE CLINIC | Facility: CLINIC | Age: 55
End: 2025-06-24
Payer: COMMERCIAL

## 2025-06-24 NOTE — TELEPHONE ENCOUNTER
Please let Patient know I have reviewed her Mammogram and there were no abnormal masses or other abnormalities of concern seen so will repeat in 1 year, thanks.

## 2025-06-26 LAB
LEFT EYE DM RETINOPATHY: NEGATIVE
RIGHT EYE DM RETINOPATHY: NEGATIVE

## 2025-06-27 ENCOUNTER — PATIENT OUTREACH (OUTPATIENT)
Dept: ADMINISTRATIVE | Facility: HOSPITAL | Age: 55
End: 2025-06-27
Payer: COMMERCIAL

## 2025-08-26 ENCOUNTER — PATIENT MESSAGE (OUTPATIENT)
Dept: PRIMARY CARE CLINIC | Facility: CLINIC | Age: 55
End: 2025-08-26
Payer: COMMERCIAL